# Patient Record
Sex: FEMALE | Race: WHITE | Employment: OTHER | URBAN - METROPOLITAN AREA
[De-identification: names, ages, dates, MRNs, and addresses within clinical notes are randomized per-mention and may not be internally consistent; named-entity substitution may affect disease eponyms.]

---

## 2022-03-01 ENCOUNTER — OFFICE VISIT (OUTPATIENT)
Dept: FAMILY MEDICINE CLINIC | Facility: CLINIC | Age: 47
End: 2022-03-01
Payer: COMMERCIAL

## 2022-03-01 VITALS
HEART RATE: 90 BPM | WEIGHT: 129.8 LBS | BODY MASS INDEX: 19.23 KG/M2 | DIASTOLIC BLOOD PRESSURE: 84 MMHG | RESPIRATION RATE: 16 BRPM | HEIGHT: 69 IN | SYSTOLIC BLOOD PRESSURE: 144 MMHG | OXYGEN SATURATION: 98 %

## 2022-03-01 DIAGNOSIS — Z00.00 WELL ADULT EXAM: Primary | ICD-10-CM

## 2022-03-01 DIAGNOSIS — H61.22 IMPACTED CERUMEN OF LEFT EAR: ICD-10-CM

## 2022-03-01 DIAGNOSIS — Z12.11 SCREENING FOR MALIGNANT NEOPLASM OF COLON: ICD-10-CM

## 2022-03-01 DIAGNOSIS — F17.200 SMOKER: ICD-10-CM

## 2022-03-01 DIAGNOSIS — E61.1 IRON DEFICIENCY: ICD-10-CM

## 2022-03-01 DIAGNOSIS — R61 NIGHT SWEATS: ICD-10-CM

## 2022-03-01 DIAGNOSIS — E53.8 B12 DEFICIENCY: ICD-10-CM

## 2022-03-01 DIAGNOSIS — D22.9 BENIGN PIGMENTED MOLE: ICD-10-CM

## 2022-03-01 DIAGNOSIS — Z12.31 ENCOUNTER FOR SCREENING MAMMOGRAM FOR BREAST CANCER: ICD-10-CM

## 2022-03-01 DIAGNOSIS — R00.2 PALPITATION: ICD-10-CM

## 2022-03-01 DIAGNOSIS — E55.9 VITAMIN D DEFICIENCY: ICD-10-CM

## 2022-03-01 PROCEDURE — 4004F PT TOBACCO SCREEN RCVD TLK: CPT | Performed by: FAMILY MEDICINE

## 2022-03-01 PROCEDURE — 3725F SCREEN DEPRESSION PERFORMED: CPT | Performed by: FAMILY MEDICINE

## 2022-03-01 PROCEDURE — 99386 PREV VISIT NEW AGE 40-64: CPT | Performed by: FAMILY MEDICINE

## 2022-03-01 PROCEDURE — 3008F BODY MASS INDEX DOCD: CPT | Performed by: FAMILY MEDICINE

## 2022-03-01 RX ORDER — PROPRANOLOL HYDROCHLORIDE 10 MG/1
10 TABLET ORAL
Qty: 30 TABLET | Refills: 0 | Status: SHIPPED | OUTPATIENT
Start: 2022-03-01

## 2022-03-01 NOTE — PROGRESS NOTES
Assessment/Plan:    New pt   hasnt seen a doctor in 25 yrs   Smoker   Does have palp worse at night   Also with left sided ear pain / sub auricular     Mole on the left scap - looks more like Seborrheic keratosis      1  Well adult exam  -     Ambulatory Referral to Gynecology; Future  -     Lipid panel; Future; Expected date: 03/01/2022  -     Comprehensive metabolic panel; Future; Expected date: 03/01/2022  -     UA w Reflex to Microscopic w Reflex to Culture; Future; Expected date: 03/01/2022    2  Night sweats    3  Palpitation  Comments:  cant sleep on the left side   Orders:  -     TSH, 3rd generation with Free T4 reflex; Future; Expected date: 03/01/2022  -     Magnesium; Future  -     propranolol (INDERAL) 10 mg tablet; Take 1 tablet (10 mg total) by mouth daily at bedtime as needed (palpatations)    4  Vitamin D deficiency  -     Vitamin D 25 hydroxy; Future; Expected date: 03/01/2022    5  B12 deficiency  -     Vitamin B12; Future; Expected date: 03/01/2022    6  Iron deficiency  -     CBC and differential; Future; Expected date: 03/01/2022  -     Iron, TIBC and Ferritin Panel; Future; Expected date: 03/01/2022    7  Screening for malignant neoplasm of colon    8  Encounter for screening mammogram for breast cancer  -     Mammo screening bilateral w 3d & cad; Future; Expected date: 03/01/2022    9  Benign pigmented mole  -     Ambulatory Referral to Dermatology; Future    10  Impacted cerumen of left ear  -     neomycin-polymyxin-hydrocortisone (CORTISPORIN) otic solution; Administer 4 drops into the left ear every 8 (eight) hours    11  Smoker         Subjective:      Patient ID: Agustin Lieberman is a 40 y o  female      HPI     [employer]     Smoker   And ear pain   And left forarm pain  For a few weeks     Growth on her left scap    The following portions of the patient's history were reviewed and updated as appropriate: allergies, current medications, past family history, past medical history, past social history, past surgical history and problem list     Review of Systems   Constitutional: Negative for fever and unexpected weight change  HENT: Negative for nosebleeds and trouble swallowing  Eyes: Negative for visual disturbance  Respiratory: Negative for chest tightness and shortness of breath  Cardiovascular: Negative for chest pain, palpitations and leg swelling  Gastrointestinal: Negative for abdominal pain, constipation, diarrhea and nausea  Endocrine: Negative for cold intolerance  Genitourinary: Negative for dysuria and urgency  Musculoskeletal: Positive for myalgias  Negative for joint swelling  Skin: Positive for color change  Negative for rash  Neurological: Negative for tremors, seizures and syncope  Hematological: Does not bruise/bleed easily  Psychiatric/Behavioral: Negative for hallucinations and suicidal ideas  Objective:      /84 (BP Location: Right arm, Patient Position: Sitting, Cuff Size: Standard)   Pulse 90   Resp 16   Ht 5' 9" (1 753 m)   Wt 58 9 kg (129 lb 12 8 oz)   LMP 02/06/2022 (Exact Date)   SpO2 98%   Breastfeeding No   BMI 19 17 kg/m²     No visits with results within 2 Week(s) from this visit  Latest known visit with results is:   No results found for any previous visit  Physical Exam  Vitals and nursing note reviewed  Constitutional:       Appearance: She is well-developed  HENT:      Head: Normocephalic and atraumatic  Cardiovascular:      Rate and Rhythm: Normal rate and regular rhythm  Heart sounds: Normal heart sounds  No murmur heard  Pulmonary:      Effort: Pulmonary effort is normal       Breath sounds: Normal breath sounds  No wheezing or rales  Abdominal:      General: Bowel sounds are normal  There is no distension  Palpations: Abdomen is soft  Tenderness: There is no abdominal tenderness  Musculoskeletal:         General: No tenderness  Normal range of motion  Cervical back: Normal range of motion and neck supple  Lymphadenopathy:      Cervical: No cervical adenopathy  Skin:     General: Skin is warm and dry  Capillary Refill: Capillary refill takes less than 2 seconds  Findings: No rash  Neurological:      Mental Status: She is alert and oriented to person, place, and time  Cranial Nerves: No cranial nerve deficit  Sensory: No sensory deficit  Motor: No abnormal muscle tone  Psychiatric:         Behavior: Behavior normal          Thought Content: Thought content normal          Judgment: Judgment normal              Depression Screening and Follow-up Plan: Patient was screened for depression during today's encounter  They screened negative with a PHQ-2 score of 0  Tobacco Cessation Counseling: Tobacco cessation counseling was provided   The patient is sincerely urged to quit consumption of tobacco  She is not ready to quit tobacco      Sudheer Obrien MD  [place] [id number]

## 2022-03-02 PROBLEM — H61.22 IMPACTED CERUMEN OF LEFT EAR: Status: ACTIVE | Noted: 2022-03-02

## 2022-03-02 PROBLEM — D22.9 BENIGN PIGMENTED MOLE: Status: ACTIVE | Noted: 2022-03-02

## 2022-03-02 PROBLEM — R00.2 PALPITATION: Status: ACTIVE | Noted: 2022-03-02

## 2022-03-02 PROBLEM — R61 NIGHT SWEATS: Status: ACTIVE | Noted: 2022-03-02

## 2022-03-02 PROBLEM — H61.22 IMPACTED CERUMEN OF LEFT EAR: Status: RESOLVED | Noted: 2022-03-02 | Resolved: 2022-03-02
